# Patient Record
Sex: FEMALE | Race: WHITE | NOT HISPANIC OR LATINO | URBAN - METROPOLITAN AREA
[De-identification: names, ages, dates, MRNs, and addresses within clinical notes are randomized per-mention and may not be internally consistent; named-entity substitution may affect disease eponyms.]

---

## 2020-09-01 ENCOUNTER — EMERGENCY (EMERGENCY)
Facility: HOSPITAL | Age: 50
LOS: 1 days | Discharge: HOME | End: 2020-09-01
Attending: EMERGENCY MEDICINE
Payer: COMMERCIAL

## 2020-09-01 VITALS
DIASTOLIC BLOOD PRESSURE: 88 MMHG | RESPIRATION RATE: 18 BRPM | HEIGHT: 65 IN | TEMPERATURE: 99 F | OXYGEN SATURATION: 95 % | WEIGHT: 139.99 LBS | HEART RATE: 90 BPM | SYSTOLIC BLOOD PRESSURE: 143 MMHG

## 2020-09-01 PROCEDURE — 96372 THER/PROPH/DIAG INJ SC/IM: CPT

## 2020-09-01 PROCEDURE — 99283 EMERGENCY DEPT VISIT LOW MDM: CPT | Mod: 25

## 2020-09-01 PROCEDURE — 99284 EMERGENCY DEPT VISIT MOD MDM: CPT

## 2020-09-01 RX ORDER — OXYCODONE HYDROCHLORIDE 5 MG/1
1 TABLET ORAL
Qty: 15 | Refills: 0
Start: 2020-09-01 | End: 2020-09-05

## 2020-09-01 RX ORDER — KETOROLAC TROMETHAMINE 30 MG/ML
60 SYRINGE (ML) INJECTION ONCE
Refills: 0 | Status: DISCONTINUED | OUTPATIENT
Start: 2020-09-01 | End: 2020-09-01

## 2020-09-01 RX ORDER — IBUPROFEN 200 MG
1 TABLET ORAL
Qty: 21 | Refills: 0
Start: 2020-09-01 | End: 2020-09-07

## 2020-09-01 RX ORDER — OXYCODONE AND ACETAMINOPHEN 5; 325 MG/1; MG/1
2 TABLET ORAL ONCE
Refills: 0 | Status: DISCONTINUED | OUTPATIENT
Start: 2020-09-01 | End: 2020-09-01

## 2020-09-01 RX ADMIN — Medication 1 TABLET(S): at 18:51

## 2020-09-01 RX ADMIN — Medication 60 MILLIGRAM(S): at 18:51

## 2020-09-01 RX ADMIN — OXYCODONE AND ACETAMINOPHEN 2 TABLET(S): 5; 325 TABLET ORAL at 18:51

## 2020-09-01 NOTE — ED STATDOCS - ENMT, MLM
Right side facial swelling over the right maxillary sinus. Missing teeth 1 and 2. Tender over tooth 3 and adjacent gum line.

## 2020-09-01 NOTE — ED ADULT TRIAGE NOTE - CHIEF COMPLAINT QUOTE
"I have an abscess on the right side of my mouth that's going into my sinuses".  Subjective fevers of 102 at home.

## 2020-09-01 NOTE — ED STATDOCS - PATIENT PORTAL LINK FT
You can access the FollowMyHealth Patient Portal offered by [facility] by registering at the following website: [email or web address]. By joining [facility]’s FollowMyHealth portal, you will also be able to view your health information using other applications (apps) compatible with our system.

## 2020-09-01 NOTE — ED STATDOCS - NSFOLLOWUPINSTRUCTIONS_ED_ALL_ED_FT
Dental Abscess     A dental abscess is an area of pus in or around a tooth. It comes from an infection. It can cause pain and other symptoms. Treatment will help with symptoms and prevent the infection from spreading.  Follow these instructions at home:  Medicines     Take over-the-counter and prescription medicines only as told by your dentist.If you were prescribed an antibiotic medicine, take it as told by your dentist. Do not stop taking it even if you start to feel better.If you were prescribed a gel that has numbing medicine in it, use it exactly as told.Do not drive or use heavy machinery (like a ) while taking prescription pain medicine.General instructions     Rinse out your mouth often with salt water.  To make salt water, dissolve ½–1 tsp of salt in 1 cup of warm water.Eat a soft diet while your mouth is healing.Drink enough fluid to keep your urine pale yellow.Do not apply heat to the outside of your mouth.Do not use any products that contain nicotine or tobacco. These include cigarettes and e-cigarettes. If you need help quitting, ask your doctor.Keep all follow-up visits as told by your dentist. This is important.Prevent an abscess     Brush your teeth every morning and every night. Use fluoride toothpaste.Floss your teeth each day.Get dental cleanings as often as told by your dentist.Think about getting dental sealant put on teeth that have deep holes (decay).Drink water that has fluoride in it.  Most tap water has fluoride.Check the label on bottled water to see if it has fluoride in it.Drink water instead of sugary drinks. Eat healthy meals and snacks. Wear a mouth guard or face shield when you play sports.Contact a doctor if:  Your pain is worse, and medicine does not help.Get help right away if:  You have a fever or chills.Your symptoms suddenly get worse.You have a very bad headache.You have problems breathing or swallowing.You have trouble opening your mouth.You have swelling in your neck or close to your eye.Summary  A dental abscess is an area of pus in or around a tooth. It is caused by an infection.Treatment will help with symptoms and prevent the infection from spreading.Take over-the-counter and prescription medicines only as told by your dentist.To prevent an abscess, take good care of your teeth. Brush your teeth every morning and night. Use floss every day. Get dental cleanings as often as told by your dentist.This information is not intended to replace advice given to you by your health care provider. Make sure you discuss any questions you have with your health care provider.

## 2020-09-01 NOTE — ED ADULT TRIAGE NOTE - NS ED NURSE BANDS TYPE
Gutierrez Visual Field 36 point screen: I have reviewed the visual fields both taped and untaped on this patient which demonstrate significant obstruction of the patient's peripheral visual field on both eyes. Name band;

## 2020-09-01 NOTE — ED STATDOCS - OBJECTIVE STATEMENT
49 y/o F pt with significant PMHx of 3 stents presents to the ED c/o worsening toothache and right-sided facial pain and swelling that began late yesterday. Denies heart valve issues. Reports subjective fever that began this morning. Further reports dizziness and HA. She was not able to go to her dentist. Denies lower mouth pain. Reports mild neck pain.   Pt smokes less than a pack a day. Denies other drug use. No further complaints at this time. 49 y/o F pt with significant PMHx of 3 stents presents to the ED c/o worsening toothache and right-sided facial pain and swelling that began late yesterday. Denies heart valve issues. Reports subjective fever that began this morning. Further reports dizziness and HA. She was not able to go to her dentist. Denies lower mouth pain. Reports mild neck pain. Pt smokes less than a pack a day. Denies other drug use. No further complaints at this time.

## 2024-01-08 NOTE — ED STATDOCS - CLINICAL SUMMARY MEDICAL DECISION MAKING FREE TEXT BOX
Pharmacies do not prioritize and instead prescribed based on availability. First come first serve in a since. We can try medication however it will need to go through the prior auth process and it is highly likely that medication will have shortages resulting in discontinuation for prolonged periods of time so you are aware. I will place the order today. Please schedule follow up appointment in two months in office. Abx and pain control. Reassess. Outpatient dental referral.

## 2024-04-29 ENCOUNTER — NON-APPOINTMENT (OUTPATIENT)
Age: 54
End: 2024-04-29